# Patient Record
Sex: FEMALE | Race: WHITE | Employment: FULL TIME | URBAN - NONMETROPOLITAN AREA
[De-identification: names, ages, dates, MRNs, and addresses within clinical notes are randomized per-mention and may not be internally consistent; named-entity substitution may affect disease eponyms.]

---
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
## 2022-02-02 ENCOUNTER — OFFICE VISIT (OUTPATIENT)
Dept: OBGYN | Age: 22
End: 2022-02-02
Payer: COMMERCIAL

## 2022-02-02 VITALS
DIASTOLIC BLOOD PRESSURE: 78 MMHG | BODY MASS INDEX: 41.12 KG/M2 | WEIGHT: 262 LBS | SYSTOLIC BLOOD PRESSURE: 116 MMHG | HEIGHT: 67 IN

## 2022-02-02 DIAGNOSIS — E66.01 OBESITY, CLASS III, BMI 40-49.9 (MORBID OBESITY) (HCC): Primary | ICD-10-CM

## 2022-02-02 DIAGNOSIS — N92.6 IRREGULAR MENSTRUAL CYCLE: ICD-10-CM

## 2022-02-02 PROCEDURE — 99213 OFFICE O/P EST LOW 20 MIN: CPT | Performed by: OBSTETRICS & GYNECOLOGY

## 2022-02-02 RX ORDER — ETHYNODIOL DIACETATE AND ETHINYL ESTRADIOL 1 MG-50MCG
KIT ORAL
Qty: 28 TABLET | Refills: 12 | Status: SHIPPED | OUTPATIENT
Start: 2022-02-02

## 2022-02-02 RX ORDER — PHENTERMINE HYDROCHLORIDE 37.5 MG/1
37.5 TABLET ORAL
Qty: 30 TABLET | Refills: 0 | Status: SHIPPED | OUTPATIENT
Start: 2022-02-02 | End: 2022-03-04 | Stop reason: SDUPTHER

## 2022-02-02 NOTE — PROGRESS NOTES
PROBLEM VISIT     Date of service: 2022    Omid Perez  Is a 22 y.o. single female    PT's PCP is: Delta Zuniga MD     : 2000                                             Subjective:       No LMP recorded. (Menstrual status: Irregular periods). OB History    Para Term  AB Living   0 0 0 0 0 0   SAB IAB Ectopic Molar Multiple Live Births   0 0 0 0 0 0        Social History     Tobacco Use   Smoking Status Never Smoker   Smokeless Tobacco Never Used        Social History     Substance and Sexual Activity   Alcohol Use No       Social History     Substance and Sexual Activity   Sexual Activity Yes    Partners: Male       Allergies: Patient has no known allergies. Chief Complaint   Patient presents with    Discuss Medications     pt presents to discuss restarting cycle control medications - was on kelnor  before - she would also like to discuss something for weight loss and for anxiety       Last Yearly:  n/a    Last pap: n/a    Last HPV: n/a      NURSE: heydi  PE:  Vital Signs  Blood pressure 116/78, height 5' 7\" (1.702 m), weight 262 lb (118.8 kg), not currently breastfeeding. Labs:    No results found for this visit on 22. NURSE: adamaris    HPI: The patient is here today as she wishes to resume her oral contraceptives for cycle regulation. She describes her periods coming anywhere from 3 weeks to 3 months apart. She has not attempting to conceive at this time. She also wishes to try a medication for weight loss. sHe has not had any thyroid testing in at least 3 years. No  PT denies fever, chills, nausea and vomiting       Objective: Heart regular rate and rhythm. Lungs are clear. Neck the thyroid is not enlarged. Assessment and Plan: I did discuss Adipex with her. I talked to her about the side effects of the medicine how to use the medicine and how to keep it refilled.   I also did discuss diet with her          Diagnosis Orders   1. Obesity, Class III, BMI 40-49.9 (morbid obesity) (HCC)  phentermine (ADIPEX-P) 37.5 MG tablet    TSH with Reflex    Hemoglobin A1C   2. Irregular menstrual cycle  ethynodiol-ethinyl estradiol (KELNOR 1/50) 1-50 MG-MCG per tablet             No follow-ups on file. FF: 15 minutes    There are no Patient Instructions on file for this visit. Over 75%of time spent on counseling and care coordination on: see assessment and plan,  She was also counseled on her preventative health maintenance recommendations and follow-up.       Vielka Avila MD,2/2/2022 3:35 PM

## 2022-03-04 ENCOUNTER — OFFICE VISIT (OUTPATIENT)
Dept: OBGYN | Age: 22
End: 2022-03-04
Payer: COMMERCIAL

## 2022-03-04 VITALS
SYSTOLIC BLOOD PRESSURE: 132 MMHG | BODY MASS INDEX: 41.28 KG/M2 | WEIGHT: 263 LBS | HEIGHT: 67 IN | DIASTOLIC BLOOD PRESSURE: 88 MMHG

## 2022-03-04 DIAGNOSIS — E66.01 OBESITY, CLASS III, BMI 40-49.9 (MORBID OBESITY) (HCC): ICD-10-CM

## 2022-03-04 PROCEDURE — 99212 OFFICE O/P EST SF 10 MIN: CPT | Performed by: OBSTETRICS & GYNECOLOGY

## 2022-03-04 RX ORDER — PHENTERMINE HYDROCHLORIDE 37.5 MG/1
37.5 TABLET ORAL
Qty: 30 TABLET | Refills: 0 | Status: SHIPPED | OUTPATIENT
Start: 2022-03-04 | End: 2022-04-03

## 2022-03-04 NOTE — PROGRESS NOTES
PROBLEM VISIT     Date of service: 3/4/2022    Veronique Villagomez  Is a 22 y.o. single female    PT's PCP is: Thuy Pickens MD     : 2000                                             Subjective:       No LMP recorded. (Menstrual status: Irregular periods). OB History    Para Term  AB Living   0 0 0 0 0 0   SAB IAB Ectopic Molar Multiple Live Births   0 0 0 0 0 0        Social History     Tobacco Use   Smoking Status Never Smoker   Smokeless Tobacco Never Used        Social History     Substance and Sexual Activity   Alcohol Use No       Social History     Substance and Sexual Activity   Sexual Activity Yes    Partners: Male       Allergies: Patient has no known allergies. Chief Complaint   Patient presents with    Weight Management     pt presents for adipex #2 up 1lb       Last Yearly:  na    Last pap: n/a    Last HPV: n/a      NURSE: heydi    PE:  Vital Signs  Blood pressure 132/88, height 5' 7\" (1.702 m), weight 263 lb (119.3 kg), not currently breastfeeding. Labs:    No results found for this visit on 22. NURSE: adamaris    HPI: The patient is here today for follow-up Adipex visit. She is not having any troublesome side effects and feels it is giving her more energy and helping her suppress cravings    No  PT denies fever, chills, nausea and vomiting       Objective: Her blood pressure is normal, no weight loss noted. Assessment and Plan: Will continue the Adipex and patient states she will continue to work on refining her diet          Diagnosis Orders   1. Obesity, Class III, BMI 40-49.9 (morbid obesity) (HCC)  phentermine (ADIPEX-P) 37.5 MG tablet             No follow-ups on file. FF: 10 minutes    There are no Patient Instructions on file for this visit. Over 75%of time spent on counseling and care coordination on: see assessment and plan,  She was also counseled on her preventative health maintenance recommendations and follow-up. Darrius Walton MD,3/4/2022 11:21 AM